# Patient Record
Sex: FEMALE | Race: WHITE | Employment: FULL TIME | URBAN - METROPOLITAN AREA
[De-identification: names, ages, dates, MRNs, and addresses within clinical notes are randomized per-mention and may not be internally consistent; named-entity substitution may affect disease eponyms.]

---

## 2018-04-05 ENCOUNTER — HOSPITAL ENCOUNTER (OUTPATIENT)
Dept: PHYSICAL THERAPY | Age: 48
Discharge: HOME | End: 2018-04-05
Payer: COMMERCIAL

## 2018-04-05 PROCEDURE — 97162 PT EVAL MOD COMPLEX 30 MIN: CPT

## 2018-04-05 PROCEDURE — 97530 THERAPEUTIC ACTIVITIES: CPT

## 2018-04-05 PROCEDURE — 97110 THERAPEUTIC EXERCISES: CPT

## 2018-04-05 PROCEDURE — 97140 MANUAL THERAPY 1/> REGIONS: CPT

## 2018-04-05 NOTE — PROGRESS NOTES
[address]   PHYSICAL THERAPY AT [address], [facility] [address] [facility], Izzy Pepe [address] - Phone: [phone]  Fax: [phone] / 0596 [facility]  Patient Name: Linda Mary : 1970   Medical   Diagnosis: Low back pain [M54.5] Treatment Diagnosis: LBP   Onset Date:      Referral Source: Perri Schwarz MD Start of Care [facility], [place]): 2018   Prior Hospitalization: See medical history Provider #: [id number]   Prior Level of Function: Hx of LBP    Comorbidities: Hypothyroidism, depression, HTN, Gall Bladder Removal (2015)   Medications: Verified on Patient Summary List   The Plan of Care and following information is based on the information from the initial evaluation.   ==================================================================================  Assessment / key information: Patient is a 49 y.o. female who presents to [facility] with diagnosis of Low back pain [M54.5]. Patient reports LBP began about 1 year ago with insidious onset. X-ray imaging of L/S and EMG revealed no significant findings. Pain is located in L>R Low back pain and described as intermittent tightness and ache. Pt notes numbness and tingling radiating down the L LE that extends to the foot. Pain level is rated at 0/10 at the best, 0/10 currently, and 5/10 at the worst. LBP increases with supine lying, prolonged standing, prolonged ambulation (~10 min), rising in the morning, household chores (cleaning tub), forward bending, and rising after prolonged sitting. Upon objective evaluation, patient demonstrates impaired and painful trunk AROM in all directions except rotation left, decreased core and multifidus strength, and impaired flexibility of left piriformis and hamstring musculature. L/S AROM is as follows: Flexion = 85%, Extension = 75%, R/L Sidebending = 65%/65%, R/L Rot = 85%/100%.  All special tests and red flags were cleared and negative. Patient scored 62 on FOTO indicating decreased functional status and quality of life. Patient can benefit from skilled PT interventions to improve L/S ROM, flexibility, core strength, decrease pain and TTP and for education on posture, body mechanics and lifting mechanics/transfers to facilitate ADL's & overall functional status/quality of life.    ==================================================================================  Problem List: pain affecting function, decrease ROM, decrease strength, edema affecting function, impaired gait/ balance, decrease ADL/ functional abilities, decrease activity tolerance, decrease flexibility/ joint mobility and decrease transfer abilities   Treatment Plan may include any combination of the following: Therapeutic exercise, Therapeutic activities, Neuromuscular re-education, Physical agent/modality, dry needling, Gait/balance training, Manual therapy and Patient education  Patient / Family readiness to learn indicated by: asking questions, trying to perform skills and interest  Persons(s) to be included in education: patient (P)  Barriers to Learning/Limitations: no  Measures taken:    Patient Goal (s): \"alleviate back pain, numbness and tigling in lower back legs and feet\"   Patient self reported health status: poor  Rehabilitation Potential: good   Short Term Goals: To be accomplished in 2  weeks:  1) Establish HEP. 2) Pt will be educated on sitting posture modification to reduce musculoskeletal strain with sitting ADL's.   3) Patient will report 25% improvement in left LE radicular symptoms in order to facilitate ease with prolonged sitting.  Long Term Goals: To be accomplished in 6 weeks:  1) Patient independent with HEP and able to demo proper body mechanics for floor to chest lifting. 2) Patient will increase L/S ROM in all directions to pain free and WNLs to increase ability to perform household chores.     3) Increase FOTO to 67 indicating improved function and quality of life. 4) Patient will report centralization L LE radicular symptoms in order to facilitate ease with prolonged sitting. 5) Patient to increase walking tolerance to 30 min in order to improve ease with grocery shopping. Frequency / Duration:   Patient to be seen  2  times per week for 6  weeks:  Patient / Caregiver education and instruction: self care, activity modification and exercises  G-Codes (GP): n/a  Eval Complexity: History: HIGH Complexity :3+ comorbidities / personal factors will impact the outcome/ POC Exam:HIGH Complexity : 4+ Standardized tests and measures addressing body structure, function, activity limitation and / or participation in recreation  Presentation: MEDIUM Complexity : Evolving with changing characteristics  Clinical Decision Making:MEDIUM Complexity : FOTO score of 26-74Overall Complexity:MEDIUM    Therapist Signature: Antoinette Gar Date: 5/8/7273   Certification Period: n/a Time: 7:30 AM   ==================================================================================  I certify that the above Physical Therapy Services are being furnished while the patient is under my care. I agree with the treatment plan and certify that this therapy is necessary. Physician Signature:        Date:       Time:     Please sign and [facility] or you may fax the signed copy to [phone]. Thank you.

## 2018-04-09 ENCOUNTER — HOSPITAL ENCOUNTER (OUTPATIENT)
Dept: PHYSICAL THERAPY | Age: 48
Discharge: HOME | End: 2018-04-09
Payer: COMMERCIAL

## 2018-04-09 PROCEDURE — 97110 THERAPEUTIC EXERCISES: CPT

## 2018-04-09 PROCEDURE — 97140 MANUAL THERAPY 1/> REGIONS: CPT

## 2018-04-09 NOTE — PROGRESS NOTES
PHYSICAL THERAPY - DAILY TREATMENT NOTE    Patient Name: India Henderson        Date: 2018  : 1970   YES Patient  Verified  Visit #:     Insurance: Payor: César Tatum / Plan: [address] [place] [address] Rd PT / Product Type: Commerical /      In time: 8:30 am Out time: 9:11   Total Treatment Time: 41     Medicare Time Tracking (below)   Total Timed Codes (min):  n/a 1:1 Treatment Time:  n/a     TREATMENT AREA =  Low back pain [M54.5]  SUBJECTIVE  Pain Level (on 0 to 10 scale):  0  / 10   Medication Changes/New allergies or changes in medical history, any new surgeries or procedures? NO    If yes, update Summary List   Subjective Functional Status/Changes:  []  No changes reported     Pt states \"yesterday I went walking and my back was hurting and it was going down both\"         OBJECTIVE  Modalities Rationale:    N/A    31 min Therapeutic Exercise:  [x]  See flow sheet   Rationale:      increase ROM and increase strength to improve the patients ability to perform ADLs. 10 min Manual Therapy: S/L STM/DTM to EMMA L/S erector spinae, sustained pressure to EMMA QL, sustained pressure to EMMA piriformis   Rationale:      decrease pain, increase ROM, increase tissue extensibility and decrease trigger points in L/S to improve patient's ability to tolerate prolonged standing. min Patient Education:  YES  Reviewed HEP   []  Progressed/Changed HEP based on: Other Objective/Functional Measures: Added TrA draw, SB isometric 1, and piriformis stretch with good pt tolerance and no complaints of sx. Post Treatment Pain Level (on 0 to 10) scale:   0  / 10     ASSESSMENT  Assessment/Changes in Function:     L post hip pain increased with TM walking at 2 min 30 sec. KANU eliminated L posterior hip sx. Patient education in concepts of centralization and peripheralization of sx in order to ensure safe performance of HEP. Updated and issued HEP with good patient understanding.       []  See Progress Note/Recertification   Patient will continue to benefit from skilled PT services to modify and progress therapeutic interventions, address functional mobility deficits, address ROM deficits, address strength deficits, analyze and address soft tissue restrictions, analyze and cue movement patterns, analyze and modify body mechanics/ergonomics and assess and modify postural abnormalities to attain remaining goals. Progress toward goals / Updated goals:    First visit after initial evaluation. Progress tx per POC.       PLAN  [x]  Upgrade activities as tolerated YES Continue plan of care   []  Discharge due to :    []  Other:      Therapist: Mervat Colbert DPT     Date: 4/9/2018 Time: 8:30 AM     Future Appointments  Date Time Provider Pooja Blair   4/12/2018 7:30 AM [address]   4/16/2018 8:00 AM [facility]   4/18/2018 8:00 AM [facility]   4/24/2018 8:00 AM [facility]   4/26/2018 9:00 AM [facility]

## 2018-04-10 ENCOUNTER — APPOINTMENT (OUTPATIENT)
Dept: PHYSICAL THERAPY | Age: 48
End: 2018-04-10
Payer: COMMERCIAL

## 2018-04-11 NOTE — PROGRESS NOTES
PHYSICAL THERAPY - DAILY TREATMENT NOTE    Patient Name: Baldemar         Date: 2018  : 1970   YES Patient  Verified  Visit #:   3   of   12  Insurance: Payor: Park Matos / Plan: [address] PT / Product Type: Commerical /      In time: 7:30 am Out time: 8:03am   Total Treatment Time: 33     Medicare Time Tracking (below)   Total Timed Codes (min):  n/a 1:1 Treatment Time:  n/a     TREATMENT AREA =  Low back pain [M54.5]  SUBJECTIVE  Pain Level (on 0 to 10 scale):  0  / 10   Medication Changes/New allergies or changes in medical history, any new surgeries or procedures? NO    If yes, update Summary List   Subjective Functional Status/Changes:  []  No changes reported     Pt states \"the leg sx have been about the same, the back bends are helping them\"       OBJECTIVE  Modalities Rationale:     n/a    24 min Therapeutic Exercise:  [x]  See flow sheet   Rationale:      increase ROM and increase strength to improve the patients ability to perform ADLs. 9 min Manual Therapy: S/L STM/DTM to EMMA L/S erector spinae, sustained pressure to EMMA QL, sustained pressure to EMMA piriformis   Rationale:      decrease pain, increase ROM, increase tissue extensibility and decrease trigger points in L/S to improve patient's ability to tolerate prolonged standing. min Patient Education:  YES  Reviewed HEP   []  Progressed/Changed HEP based on: Other Objective/Functional Measures: Added BJORN and BKFO with good pt tolerance and no complaints of sx. Post Treatment Pain Level (on 0 to 10) scale:   0  / 10     ASSESSMENT  Assessment/Changes in Function:     Pt presented with increased TTP and mm tone in R QL and EMMA piriformis. Progressed extension and core strengthening as appropriate. Updated and issued HEP.       []  See Progress Note/Recertification   Patient will continue to benefit from skilled PT services to modify and progress therapeutic interventions, address functional mobility deficits, address ROM deficits, address strength deficits, analyze and address soft tissue restrictions, analyze and cue movement patterns, analyze and modify body mechanics/ergonomics and assess and modify postural abnormalities to attain remaining goals. Progress toward goals / Updated goals:    Progressing towards STGs 1-2.   1) Establish HEP. Goal in progress - Initial HEP established  2) Pt will be educated on sitting posture modification to reduce musculoskeletal strain with sitting ADL's.       PLAN  [x]  Upgrade activities as tolerated YES Continue plan of care   []  Discharge due to :    []  Other:      Therapist: Thais Saul DPT     Date: 4/12/2018 Time: 2:16 PM     Future Appointments  Date Time Provider Pooja Blair   4/12/2018 7:30 AM [address]   4/16/2018 8:00 AM [facility]   4/18/2018 8:00 AM [facility]   4/24/2018 8:00 AM [facility]   4/26/2018 9:00 AM [facility]

## 2018-04-12 ENCOUNTER — HOSPITAL ENCOUNTER (OUTPATIENT)
Dept: PHYSICAL THERAPY | Age: 48
Discharge: HOME | End: 2018-04-12
Payer: COMMERCIAL

## 2018-04-12 PROCEDURE — 97140 MANUAL THERAPY 1/> REGIONS: CPT

## 2018-04-12 PROCEDURE — 97110 THERAPEUTIC EXERCISES: CPT

## 2018-04-16 ENCOUNTER — HOSPITAL ENCOUNTER (OUTPATIENT)
Dept: PHYSICAL THERAPY | Age: 48
End: 2018-04-16
Payer: COMMERCIAL

## 2018-04-18 ENCOUNTER — APPOINTMENT (OUTPATIENT)
Dept: PHYSICAL THERAPY | Age: 48
End: 2018-04-18
Payer: COMMERCIAL

## 2018-04-19 ENCOUNTER — HOSPITAL ENCOUNTER (OUTPATIENT)
Dept: PHYSICAL THERAPY | Age: 48
End: 2018-04-19
Payer: COMMERCIAL

## 2018-04-24 ENCOUNTER — HOSPITAL ENCOUNTER (OUTPATIENT)
Dept: PHYSICAL THERAPY | Age: 48
Discharge: HOME | End: 2018-04-24
Payer: COMMERCIAL

## 2018-04-24 PROCEDURE — 97140 MANUAL THERAPY 1/> REGIONS: CPT

## 2018-04-24 PROCEDURE — 97110 THERAPEUTIC EXERCISES: CPT

## 2018-04-24 NOTE — PROGRESS NOTES
PHYSICAL THERAPY - DAILY TREATMENT NOTE    Patient Name: Krissy Rascon        Date: 2018  : 1970   YES Patient  Verified  Visit #:     Insurance: Payor: Radha Romero / Plan: [address] [place] [address] Rd PT / Product Type: Commerical /      In time: 7:37am Out time: 8:24am   Total Treatment Time: 47     Medicare Time Tracking (below)   Total Timed Codes (min):  n/a 1:1 Treatment Time:  n/a     TREATMENT AREA =  Low back pain [M54.5]  SUBJECTIVE  Pain Level (on 0 to 10 scale):  0   / 10   Medication Changes/New allergies or changes in medical history, any new surgeries or procedures? NO    If yes, update Summary List   Subjective Functional Status/Changes:  []  No changes reported     Pt states \"I was cooking yesterday and I got the numbness in my right thigh and I did the back extensions and it took it away. \"       OBJECTIVE  Modalities Rationale:    PD    35 min Therapeutic Exercise:  [x]  See flow sheet   Rationale:      increase ROM and increase strength to improve the patients ability to perform ADLs. 12 min Manual Therapy: S/L STM/DTM to EMMA L/S erector spinae, sustained pressure to EMMA QL, sustained pressure to EMMA piriformis   Rationale:      decrease pain, increase ROM, increase tissue extensibility and decrease trigger points in L/S to improve patient's ability to tolerate prolonged standing. min Patient Education:  YES  Reviewed HEP   []  Progressed/Changed HEP based on: Other Objective/Functional Measures: Added march and hamstring stretch with good pt tolerance and no complaints of sx.    Pre Test:  Location of sx: R buttocks  Flexion = 65% (upper tibia)  Extension = 75%  R/L Side glide = 50/75%  R/L Rot = 85%    Post KANU 2x10:  Location of sx: no post hip sx  Flexion = 85% (lower tibia)  R/L Side glide= 75%2  R/L Rot = 85%     Post Treatment Pain Level (on 0 to 10) scale:   0  / 10     ASSESSMENT  Assessment/Changes in Function:     Pt presented with increased TTP and mm tone in R QL and R piriformis. BJORN increased R LE numbness. KANU increased gross L/S AROM and eliminated R post hip sx. Progressed flexibility and core strengthening as appropriate. []  See Progress Note/Recertification   Patient will continue to benefit from skilled PT services to modify and progress therapeutic interventions, address functional mobility deficits, address ROM deficits, address strength deficits, analyze and address soft tissue restrictions, analyze and cue movement patterns, analyze and modify body mechanics/ergonomics and assess and modify postural abnormalities to attain remaining goals. Progress toward goals / Updated goals:    1) Establish HEP. 2) Pt will be educated on sitting posture modification to reduce musculoskeletal strain with sitting ADL's. Goal Met - patient educated in proper sitting, sleeping and standing posture  3) Patient will report 25% improvement in left LE radicular symptoms in order to facilitate ease with prolonged sitting.       PLAN  [x]  Upgrade activities as tolerated YES Continue plan of care   []  Discharge due to :    []  Other:      Therapist: Tushar Webber DPT     Date: 4/24/2018 Time: 7:01 AM     Future Appointments  Date Time Provider Pooja Blair   4/24/2018 8:00 AM [address]   4/26/2018 9:00 AM [address]

## 2018-04-26 ENCOUNTER — HOSPITAL ENCOUNTER (OUTPATIENT)
Dept: PHYSICAL THERAPY | Age: 48
Discharge: HOME | End: 2018-04-26
Payer: COMMERCIAL

## 2018-04-26 PROCEDURE — 97110 THERAPEUTIC EXERCISES: CPT

## 2018-04-26 PROCEDURE — 97140 MANUAL THERAPY 1/> REGIONS: CPT

## 2018-04-26 NOTE — PROGRESS NOTES
PHYSICAL THERAPY - DAILY TREATMENT NOTE    Patient Name: Ray Valdez        Date: 2018  : 1970   YES Patient  Verified  Visit #:     Insurance: Payor: Price Mulling / Plan: [address] [place] [address] [address] PT / Product Type: Commerical /      In time: 8:50am Out time: 9:38   Total Treatment Time: 48     Medicare Time Tracking (below)   Total Timed Codes (min):  n/a 1:1 Treatment Time:  n/a     TREATMENT AREA =  Low back pain [M54.5]  SUBJECTIVE  Pain Level (on 0 to 10 scale):  0  / 10   Medication Changes/New allergies or changes in medical history, any new surgeries or procedures? NO    If yes, update Summary List   Subjective Functional Status/Changes:  []  No changes reported     Pt states \"I think its been doing pretty good, numbness and tingling a little less frequent\"         OBJECTIVE  Modalities Rationale:    N/A    38 min Therapeutic Exercise:  [x]  See flow sheet   Rationale:      increase ROM and increase strength to improve the patients ability to perform ADLs. 10 min Manual Therapy: S/L STM/DTM to EMMA L/S erector spinae, sustained pressure to EMMA QL, sustained pressure to EMMA piriformis   Rationale:      decrease pain, increase ROM, increase tissue extensibility and decrease trigger points in L/S to improve patient's ability to tolerate prolonged standing. min Patient Education:  YES  Reviewed HEP   []  Progressed/Changed HEP based on: Other Objective/Functional Measures: Added prone mult ext and march with core with good pt tolerance and no complaints of sx. Post Treatment Pain Level (on 0 to 10) scale:   0  / 10     ASSESSMENT  Assessment/Changes in Function:     Pt presented with increased TTP and mm tone in L piriformis and R QL. Demonstrated good  EMMA LE extension with prone mult ext, however, fatigue as repetitions increased. Updated and issued core strengthening and flexibility HEP.       []  See Progress Note/Recertification   Patient will continue to benefit from skilled PT services to modify and progress therapeutic interventions, address functional mobility deficits, address ROM deficits, address strength deficits, analyze and address soft tissue restrictions, analyze and cue movement patterns, analyze and modify body mechanics/ergonomics and assess and modify postural abnormalities to attain remaining goals. Progress toward goals / Updated goals:    Progressing towards all STGs. PLAN  [x]  Upgrade activities as tolerated YES Continue plan of care   []  Discharge due to :    []  Other:      Therapist: Joya Milton DPT     Date: 4/26/2018 Time: 9:01 AM     No future appointments.

## 2018-04-30 ENCOUNTER — HOSPITAL ENCOUNTER (OUTPATIENT)
Dept: PHYSICAL THERAPY | Age: 48
Discharge: HOME | End: 2018-04-30
Payer: COMMERCIAL

## 2018-04-30 PROCEDURE — 97110 THERAPEUTIC EXERCISES: CPT

## 2018-04-30 PROCEDURE — 97140 MANUAL THERAPY 1/> REGIONS: CPT

## 2018-04-30 NOTE — PROGRESS NOTES
PHYSICAL THERAPY - DAILY TREATMENT NOTE    Patient Name: Savannah Whatley        Date: 2018  : 1970   YES Patient  Verified  Visit #:     Insurance: Payor: Collins Sable / Plan: [address] [place] [address] [address] PT / Product Type: Commerical /      In time: 7:45  Out time: 8:43 am   Total Treatment Time: 58     TREATMENT AREA =  Low back pain [M54.5]    SUBJECTIVE  Pain Level (on 0 to 10 scale): 2 / 10-low back   Medication Changes/New allergies or changes in medical history, any new surgeries or procedures? NO    If yes, update Summary List   Subjective Functional Status/Changes:  []  No changes reported     Pt reports ~ 40 improvement in R LE sx. OBJECTIVE  Modalities Rationale: na     48 min Therapeutic Exercise:  [x]  See flow sheet   Rationale:      increase ROM and increase strength to improve the patients ability to perform supine lying, prolonged standing, prolonged ambulation. 10 min Manual Therapy: Prone STM/ DTM to lower lumbar para spinals, TrPt release to L piriformis > R with TrPt release to L QL   Rationale:      decrease pain, increase ROM, increase tissue extensibility and decrease trigger points to improve patient's ability toperform supine lying, prolonged standing, prolonged ambulation. min Patient Education:  YES  Reviewed HEP   []  Progressed/Changed HEP based on: Other Objective/Functional Measures:    Discussed proper supportive foot wear for home walking. Post Treatment Pain Level (on 0 to 10) scale:   0  / 10     ASSESSMENT  Assessment/Changes in Function:   Pt demonstrating good centralization of sx with KANU. Advanced core stabilization to standing with no sx increase indicating improving core strength.      []  See Progress Note/Recertification   Patient will continue to benefit from skilled PT services to modify and progress therapeutic interventions, address functional mobility deficits, address ROM deficits, address strength deficits, analyze and address soft tissue restrictions and instruct in home and community integration to attain remaining goals. Progress toward goals / Updated goals:  1) Establish HEP.     2) Pt will be educated on sitting posture modification to reduce musculoskeletal strain with sitting ADL's.   3) Patient will report 25% improvement in left LE radicular symptoms in order to facilitate ease with prolonged sitting.         PLAN  []  Upgrade activities as tolerated YES Continue plan of care   []  Discharge due to :    []  Other:      Therapist: Denny Muniz PTA    Date: 4/30/2018 Time: 8:43  AM     Future Appointments  Date Time Provider Pooja Blair   4/30/2018 8:00 AM Denny Muniz PTA [facility]   5/15/2018 8:00 AM [address]   5/17/2018 4:30 PM [address]   5/22/2018 8:00 AM [address]

## 2018-05-01 ENCOUNTER — APPOINTMENT (OUTPATIENT)
Dept: PHYSICAL THERAPY | Age: 48
End: 2018-05-01
Payer: COMMERCIAL

## 2018-05-15 ENCOUNTER — HOSPITAL ENCOUNTER (OUTPATIENT)
Dept: PHYSICAL THERAPY | Age: 48
Discharge: HOME | End: 2018-05-15
Payer: COMMERCIAL

## 2018-05-15 PROCEDURE — 97140 MANUAL THERAPY 1/> REGIONS: CPT

## 2018-05-15 PROCEDURE — 97110 THERAPEUTIC EXERCISES: CPT

## 2018-05-15 NOTE — PROGRESS NOTES
[address] [facility] THERAPY  [address] -   Phone: [phone]  Fax: [phone]  [x]  PROGRESS NOTE  []   [facility] SUMMARY  Patient Name: Krissy Rascon : 1970   Treatment Diagnosis: Low back pain [M54.5]     Referral Source: Sancho Rdz MD     Date of Initial Visit: 2018 Attended Visits: 7 Missed Visits: 3     SUMMARY OF TREATMENT  Therapeutic exercises including ROM, strengthening, stretching, manual therapy including joint and soft tissue manipulation, balance training, postural re-education, and HEP instruction. CURRENT STATUS  The pt has progressed well with therapy, consistently reporting decreased pain and increased functional ability. Currently, the patient's main complaint is continued R>L LE numbness with ambuation. Patient independent with repeated back extension to manage EMMA LE sx. Average lumbar pain is rated at 5/10 and 6/10 at the worst. Trunk ROM is as follows: flexion 95%, extension 75%, R/L Side bending 75/85%, and R/L Rotation 85/85%. Pt would benefit from continued PT services in order to improve L/S AROM, core strength, LE strength, flexibility, functional mobility and address remaining impairments. Goal/Measure of Progress Goal Met? 1.  Establish HEP to prevent further disability. Status at last Eval: Established Current Status: I with HEP yes   2. Pt will be educated on sitting posture modification to reduce musculoskeletal strain with sitting ADL's. Status at last Eval: Goal established Current Status: Patient educated in proper sitting, standing, and sleeping posture yes   3. Patient will report 25% improvement in left LE radicular symptoms in order to facilitate ease with prolonged sitting. Status at last Eval: Goal established Current Status: >25% improvement in L LE sx yes     New Goals to be achieved in __3-4__  Weeks:  1.   Patient to be independent with HEP and able to demo proper body mechanics for floor to chest lifting. 2.  Patient will increase L/S ROM in all directions to pain free and WNLs to increase ability to perform household chores. 3.  Increase FOTO to 67 indicating improved function and quality of life. 4.   Patient will report centralization L LE radicular symptoms in order to facilitate ease with prolonged sitting. 5.  Patient to increase walking tolerance to 30 min in order to improve ease with grocery shopping. G-Codes (GP): n/a  RECOMMENDATIONS  Continue therapy with the following recommendations: 2x per week for 2-3 weeks    If you have any questions/comments please contact us directly at [phone]   Thank you for allowing us to assist in the care of your patient. Therapist Signature: Joya Milton DPT Date: 5/15/2018     Time: 6:54 AM   NOTE TO PHYSICIAN:  PLEASE COMPLETE THE ORDERS BELOW AND FAX TO   [facility]: [phone]  If you are unable to process this request in 24 hours please contact our office: [phone]    ___ I have read the above report and request that my patient continue as recommended.   ___ I have read the above report and request that my patient continue therapy with the following changes/special instructions:_________________________________________________________   ___ I have read the above report and request that my patient be discharged from therapy.      Physician Signature:        Date:       Time:

## 2018-05-15 NOTE — PROGRESS NOTES
PHYSICAL THERAPY - DAILY TREATMENT NOTE    Patient Name: Jacqueline Meléndez        Date: 5/15/2018  : 1970   YES Patient  Verified  Visit #:     Insurance: Payor: Ivory Shearer / Plan: [address] [place] [address] Rd PT / Product Type: Commerical /      In time: 7:45 am Out time: 8:29 am   Total Treatment Time: 44     Medicare Time Tracking (below)   Total Timed Codes (min):  n/a 1:1 Treatment Time:  n/a     TREATMENT AREA =  Low back pain [M54.5]    SUBJECTIVE  Pain Level (on 0 to 10 scale):  0  / 10   Medication Changes/New allergies or changes in medical history, any new surgeries or procedures? NO    If yes, update Summary List   Subjective Functional Status/Changes:  []  No changes reported     Patient states \">25 % improvement in LE sx since starting therapy\"          OBJECTIVE  Modalities Rationale:     n/a    35 min Therapeutic Exercise:  [x]  See flow sheet   Rationale:      increase ROM and increase strength to improve the patients ability to perform ADLs. 9 min Manual Therapy: Prone STM/ DTM to lower lumbar para spinals, TrPt release to L piriformis > R with TrPt release to L QL   Rationale:      decrease pain, increase ROM, increase tissue extensibility and decrease trigger points to improve patient's ability to tolerate prolonged ambulation     min Patient Education:  YES  Reviewed HEP   []  Progressed/Changed HEP based on:        Other Objective/Functional Measures:    FOTO = 60     Post Treatment Pain Level (on 0 to 10) scale:   0  / 10     ASSESSMENT  Assessment/Changes in Function:     See PN.     []  See Progress Note/Recertification   Patient will continue to benefit from skilled PT services to modify and progress therapeutic interventions, address functional mobility deficits, address ROM deficits, address strength deficits, analyze and address soft tissue restrictions, analyze and cue movement patterns, analyze and modify body mechanics/ergonomics, assess and modify postural abnormalities, address imbalance/dizziness and instruct in home and community integration to attain remaining goals. Progress toward goals / Updated goals:    See PN.       PLAN  []  Upgrade activities as tolerated YES Continue plan of care   []  Discharge due to :    []  Other:      Therapist: Sd Garcia    Date: 5/15/2018 Time: 6:57 AM     Future Appointments  Date Time Provider Pooja Blair   5/15/2018 8:00 AM [address]   5/17/2018 4:30 PM [address]   5/22/2018 8:00 AM [address]

## 2018-05-17 ENCOUNTER — APPOINTMENT (OUTPATIENT)
Dept: PHYSICAL THERAPY | Age: 48
End: 2018-05-17
Payer: COMMERCIAL

## 2018-05-21 NOTE — PROGRESS NOTES
PHYSICAL THERAPY - DAILY TREATMENT NOTE    Patient Name: Keshawn Gonzalez        Date: 2018  : 1970   YES Patient  Verified  Visit #:     Insurance: Payor: Surya Harris / Plan: [address] PT / Product Type: Commerical /      In time: 7:32 am Out time: 8:24 am   Total Treatment Time: 52     Medicare Time Tracking (below)   Total Timed Codes (min):  n/a 1:1 Treatment Time:  n/a     TREATMENT AREA =  Low back pain [M54.5]  SUBJECTIVE  Pain Level (on 0 to 10 scale):  0  / 10   Medication Changes/New allergies or changes in medical history, any new surgeries or procedures? NO    If yes, update Summary List   Subjective Functional Status/Changes:  []  No changes reported     Pt states \"i think were making improvement\"       OBJECTIVE  Modalities Rationale:    n/a    41 min Therapeutic Exercise:  [x]  See flow sheet   Rationale:      increase ROM and increase strength to improve the patients ability to perform ADLs. 11 min Manual Therapy: Prone STM/ DTM to lower lumbar para spinals, TrPt release to L piriformis > R with TrPt release to L QL, MET to correct L ant rot innominat   Rationale:      decrease pain, increase ROM, increase tissue extensibility and decrease trigger points in L/S to improve patient's ability to tolerate prolonged standing. min Patient Education:  YES  Reviewed HEP   []  Progressed/Changed HEP based on: Other Objective/Functional Measures: Added bridge and SLR with core with good pt tolerance and no complaints of sx. Post Treatment Pain Level (on 0 to 10) scale:   0  / 10     ASSESSMENT  Assessment/Changes in Function:     Pt presented with increased TTP and mm tone in left piriformis. Demonstrates about ~75% bridge. Progressed core strengthening as appropriate. Patient has progressed well with PT services consistently reporting improved sx and functional mobility, thus patient is appropriate for D/C to home mgt of sx next visit.  Discussed discharge planning, with patient reported agreeance. []  See Progress Note/Recertification   Patient will continue to benefit from skilled PT services to modify and progress therapeutic interventions, address functional mobility deficits, address ROM deficits, address strength deficits, analyze and address soft tissue restrictions, analyze and cue movement patterns, analyze and modify body mechanics/ergonomics and assess and modify postural abnormalities to attain remaining goals.    Progress toward goals / Updated goals:    Progressing towards newly est LTG s     PLAN  [x]  Upgrade activities as tolerated YES Continue plan of care   []  Discharge due to :    []  Other:      Therapist: Alicia Spencer DPT     Date: 5/22/2018 Time: 3:01 PM     Future Appointments  Date Time Provider Pooja Blair   5/22/2018 8:00 AM [address]   5/24/2018 8:00 AM [address]

## 2018-05-22 ENCOUNTER — HOSPITAL ENCOUNTER (OUTPATIENT)
Dept: PHYSICAL THERAPY | Age: 48
Discharge: HOME | End: 2018-05-22
Payer: COMMERCIAL

## 2018-05-22 PROCEDURE — 97140 MANUAL THERAPY 1/> REGIONS: CPT

## 2018-05-22 PROCEDURE — 97110 THERAPEUTIC EXERCISES: CPT

## 2018-05-23 NOTE — PROGRESS NOTES
PHYSICAL THERAPY - DAILY TREATMENT NOTE    Patient Name: Liv Coles        Date:2018  : 1970   YES Patient  Verified  Visit #:     Insurance: Payor: Araceli Mccormick / Plan: [address] [place] [address] Rd PT / Product Type: Commerical /      In time: 7:40 am Out time: 8:25am   Total Treatment Time: 45     Medicare Time Tracking (below)   Total Timed Codes (min):  n/a 1:1 Treatment Time:  n/a     TREATMENT AREA =  Low back pain [M54.5]    SUBJECTIVE  Pain Level (on 0 to 10 scale):  0  / 10   Medication Changes/New allergies or changes in medical history, any new surgeries or procedures? NO    If yes, update Summary List   Subjective Functional Status/Changes:  []  No changes reported     Pt states \"I feel about 60% better the leg sx are less often \"         OBJECTIVE  Modalities Rationale:     N/A    45 min Therapeutic Exercise:  [x]  See flow sheet   Rationale:      increase ROM and increase strength to improve the patients ability to perform ADLs. min Patient Education:  YES  Reviewed HEP   []  Progressed/Changed HEP based on: Other Objective/Functional Measures:    FOTO = 65     Post Treatment Pain Level (on 0 to 10) scale:   0  / 10     ASSESSMENT  Assessment/Changes in Function:     See DC.     []  See Progress Note/Recertification   Patient will continue to benefit from skilled PT services to modify and progress therapeutic interventions, address functional mobility deficits, address ROM deficits, address strength deficits, analyze and address soft tissue restrictions, analyze and cue movement patterns, analyze and modify body mechanics/ergonomics, assess and modify postural abnormalities, address imbalance/dizziness and instruct in home and community integration to attain remaining goals. Progress toward goals / Updated goals:    See DC     PLAN  []  Upgrade activities as tolerated YES Continue plan of care   [x]  Discharge due to : Met or progressing towards all goals.    [] Other:      Therapist: Michelle Acevedo    Date: 5/24/2018 Time: 1:52 PM     Future Appointments  Date Time Provider Pooja Blair   5/24/2018 8:00 AM Michelle Acevedo [facility]

## 2018-05-23 NOTE — PROGRESS NOTES
[address] [place] [address] PHYSICAL THERAPY AT [facility] [address], [address] [facility], [address]  Phone: [phone]  Fax: [phone] SUMMARY  Patient Name: Damien Arreguin : 1970   Treatment/Medical Diagnosis: Low back pain [M54.5]   Referral Source: Ray Conde MD     Date of Initial Visit: 2018 Attended Visits: 9 Missed Visits: 4     SUMMARY OF TREATMENT  Therapeutic exercises including ROM, strengthening, stretching, manual therapy including joint and soft tissue manipulation, balance training, postural re-education, and HEP instruction. CURRENT STATUS  The pt has progressed well with therapy, consistently reporting decreased pain and increased functional ability in household chores, prolonged ambulation, and forward bending. Pt reports 60% improvement in L/S sx since initiating PT services. Based on progress from PT services and pt reported improvement, patient is appropriate for DC to home mgt of sx at this time. Goal/Measure of Progress Goal Met? 1. Patient to be independent with HEP and able to demo proper body mechanics for floor to chest lifting   Status at last Eval: Goal Established Current Status: I with HEP yes   2. Patient will increase L/S ROM in all directions to pain free and WNLs to increase ability to perform household chores.     Status at last Eval: Flexion = 85% Extension = 75% R/L Sidebending = 65%/65%  R/L Rot = 85%/100% Current Status: Flexion = 85%  Extension = 85%  R/L Sidebending = 100%  R/L Rot = 100% yes   3. Increase FOTO to 67 indicating improved function and quality of life. Status at last Eval: FOTO = 57 Current Status: FOTO = 65  Progressing    4. Patient will report centralization L LE radicular symptoms in order to facilitate ease with prolonged sitting   Status at last Eval: Goal established Current Status: Decrease frequency of EMMA LE sx, able to centralize with repeated back ext yes   5.  Patient to increase walking tolerance to 30 min in order to improve ease with grocery shopping. Walking tolerance = ~10 min  Current Status Walking tolerance = >/= 15 min  Progressing     RECOMMENDATIONS  Discontinue therapy. Progressing towards or have reached established goals. If you have any questions/comments please contact us directly at [phone]. Thank you for allowing us to assist in the care of your patient.     Therapist Signature: Kristen Paris Date: 5/24/2018     Time: 1:48 PM

## 2018-05-24 ENCOUNTER — HOSPITAL ENCOUNTER (OUTPATIENT)
Dept: PHYSICAL THERAPY | Age: 48
Discharge: HOME | End: 2018-05-24
Payer: COMMERCIAL

## 2018-05-24 PROCEDURE — 97110 THERAPEUTIC EXERCISES: CPT

## 2022-04-25 NOTE — PROGRESS NOTES
Dr. Burrell PP pt.   Vaginal delivery on 4/17  Pt. Is BF     S/w pt she states she is currently breastfeeding every 2-3 hours around the clock and yesterday she had chills and aching all over, her right breast was red around the nipple (underneath) and it was hot to the touch and her left breast felt like she had 2-3 clogged ducts underneath her arm pit area. Patient states she woke up today and felt a little better but right breast is still red and hot to touch but does not feel as engorged today.     Patient denies fever    Patient wanted to know if we could go ahead and prescribe her something to get ahead of these s/s or if she needs to monitor it. I told patient I would discuss with Dr. Burrell or NP and CB with plan of care. She v/u    PHYSICAL THERAPY - DAILY TREATMENT NOTE    Patient Name: Damien Arreguin        Date: 2018  : 1970   YES Patient  Verified  Visit #:     Insurance: Payor: Reyes Daunt / Plan: [address] PT / Product Type: Commerical /      In time: 10:53 am Out time: 11:53am   Total Treatment Time: 60     Medicare Time Tracking (below)   Total Timed Codes (min):  n/a 1:1 Treatment Time:  n/a     TREATMENT AREA =  Low back pain [M54.5]  SUBJECTIVE  Pain Level (on 0 to 10 scale):  0  / 10   Medication Changes/New allergies or changes in medical history, any new surgeries or procedures? NO    If yes, update Summary List   Subjective Functional Status/Changes:  []  No changes reported     See POC         OBJECTIVE  Modalities Rationale:     N/A    10 min Therapeutic Exercise:  [x]  See flow sheet   Rationale:      increase ROM and increase strength to improve the patients ability to perform ADLs. 8 min Manual Therapy: Prone STM/DTM to EMMA L/S erector spinae, sustained pressure to EMMA QL, sustained pressure to EMMA piriformis   Rationale:      decrease pain, increase ROM and increase tissue extensibility of L/S to improve patient's ability to tolerate prolonged standing. 9 min Therapeutic Activity: Education in anatomy and physiology of the spine, and use of towel roll in prolonged sitting, importance of posture   Rationale:    Improve positioning of L/S to improve the patients ability to tolerate prolonged sitting.      min Patient Education:  YES  Reviewed HEP   []  Progressed/Changed HEP based on:         Other Objective/Functional Measures:   Physical Therapy Evaluation - Lumbar Spine ([facility])    SUBJECTIVE  Aggravated by:   [x] Bending [x] Sitting [x] Standing [x] Walking   [] Moving [] Cough [] Sneeze [] Valsalva   [x] AM  [] PM  Lying:  [x] sup   [] pro   [] sidelying   [x] Other: Rising      Eased by:    [] Bending [] Sitting [] Standing [] Walking   [x] Moving [] AM  [] PM  Lying: [] sup  [] pro  [] sidelying   [] Other:     General Health:  Red Flags Indicated? [] Yes    [] No  [] Yes [x] No Recent weight change (If yes, due to dieting?  [] Yes  [] No)   [] Yes [x] No Weakness in legs during walking   [] Yes [x] No Unremitting pain at night  [] Yes [x] No Abdominal pain or problems  [] Yes [x] No Rectal bleeding  [] Yes [x] No Blood or pain with urination  [] Yes [x] No Dysfunction of bowel or bladder  [] Yes [] No Numbness/tingling in buttock/genitalia region    Past History/Treatments:     Diagnostic Tests: [] Lab work [x] X-rays    [] CT [] MRI     [] Other:  Results: EMG - no significant findings    OBJECTIVE  Posture:  Lateral Shift: [] R    [] L     [] +  [x] -  Kyphosis: [] Increased [] Decreased   [x]  WNL  Lordosis:  [x] Increased [] Decreased   [] WNL    Gait:  [x] Normal     [] Abnormal:    Active Movements: [] N/A   [] Too acute   [] Other:  ROM % AROM % PROM Comments:pain, area   Forward flexion 40-60 85%  EMMA LE Pull    Extension 20-30 75%  Central L/S pain    SB right 20-30 65% Lower femur  L LBP   SB left 20-30 65% Lower femur R LBP   Rotation right 5-10 85%     Rotation left 5-10 100%       Repeated Movements   Effects on present pain: produces (AK), abolishes (A), increases (incr), decreases (decr), centralizes (C), peripheral (PH), no effect (NE)   Pre-Test Sx Flexion Repeated Flexion Extension Repeated Extension Repeated SBL Repeated SBR   Sitting          Standing    incr R LE N/T      Lying      N/A N/A     R/L Side Glide: 75%/50%  Sustained passive positioning test: Prone lying 0-2 pillows, BJORN incr R LE N/T    Neuro Screen [x] WNL  Myotome/Dermatome/Reflexes: L2-S2 EMMA LE light touch sensation WNLs:    Palpation  [x] Min  [] Mod  [] Severe    Location: L/S Paraspinals (R/L Min)  [x] Min  [] Mod  [] Severe    Location: Quadratus Lumborum (L Min) (R NIL)  [] Min  [x] Mod  [] Severe    Location: Piriformis (L mod) (R min)    Strength   L(0-5) R (0-5) N/T Psoas  (L1,2) 5- 5-    []   Quadriceps (L3,4) 5- 5- []   Ant Tibialis (L4) 4+ 5- []   Extensor Hallicus (L5) 4+ 5-    Gluteus Medius (L5) 4- 4+ []   Gastrocnemius (S1, S2) 3 3 []   Hamstring (S1,2) 5- 5- []   Gluteus Romero (S1, S2) 4- 4+ []   Supine Bridge 85%  []     Special Tests    [place]:  Distraction: [] R    [] L    [] +    [x] -     Compression: [] +    [x] -      Leg Length: [x] +    [] -   Position: (+L post)     Mobility: Standing flex: (+L)     Supine to sit: (=)         Hip: Seema Mikaela:  [x] R    [x] L    [] +    [x] -     Piriformis: [] R    [] L    [] +    [] -          Deficits: Babita's: [] R    [x] L    [x] +    [] -     Hamstrings 90/90: R159/L152    Gastrocsoleus (to neutral): Right/Left: >neutral  Dural Mobility:  SLR Supine: [x] R    [x] L    [] +    [x] -  @ (degrees):   Slump Test: [x] R    [x] L    [] +    [x] -  @ (degrees):   Prone Knee Bend: [] R    [] L    [] +    [x] -     Justification for Eval Complexity:   Patient History: HIGH Complexity :3+ comorbidities / personal factors will impact the outcome/ POC  (Hypothyroidism, depression, HTN, Gall Bladder Removal (2/25/2015), Anxiety)  Examination:HIGH Complexity : 4+ Standardized tests and measures addressing body structure, function, activity limitation and / or participation in recreation  (See POC and musculoskeletal examination attached)  Clinical Presentation: MEDIUM Complexity : Evolving with changing characteristics  (pain level 0/10 on average and 5/10 @ worst, N/T R LE >L LE, intermittent pain)  Clinical Decision Making:MEDIUM Complexity : FOTO score of 26-74 (Foto 57/100)  Overall Complexity:MEDIUM     Post Treatment Pain Level (on 0 to 10) scale:   0  / 10     ASSESSMENT  Assessment/Changes in Function:     See POC     []  See Progress Note/Recertification   Patient will continue to benefit from skilled PT services to modify and progress therapeutic interventions, address functional mobility deficits, address ROM deficits, address strength deficits, analyze and address soft tissue restrictions, analyze and cue movement patterns, analyze and modify body mechanics/ergonomics and assess and modify postural abnormalities to attain remaining goals.    Progress toward goals / Updated goals:    See POC     PLAN  [x]  Upgrade activities as tolerated YES Continue plan of care   []  Discharge due to :    []  Other:      Therapist: Mervat Colbert DPT     Date: 4/5/2018 Time: 7:30 AM     Future Appointments  Date Time Provider Pooja Blair   4/5/2018 11:00 AM [address]